# Patient Record
Sex: MALE | URBAN - METROPOLITAN AREA
[De-identification: names, ages, dates, MRNs, and addresses within clinical notes are randomized per-mention and may not be internally consistent; named-entity substitution may affect disease eponyms.]

---

## 2021-09-02 ENCOUNTER — OFFICE VISIT (OUTPATIENT)
Dept: URBAN - METROPOLITAN AREA CLINIC 128 | Facility: CLINIC | Age: 71
End: 2021-09-02
Payer: MEDICARE

## 2021-09-02 ENCOUNTER — WEB ENCOUNTER (OUTPATIENT)
Dept: URBAN - METROPOLITAN AREA CLINIC 128 | Facility: CLINIC | Age: 71
End: 2021-09-02

## 2021-09-02 ENCOUNTER — DASHBOARD ENCOUNTERS (OUTPATIENT)
Age: 71
End: 2021-09-02

## 2021-09-02 DIAGNOSIS — Z79.01 CHRONIC ANTICOAGULATION: ICD-10-CM

## 2021-09-02 DIAGNOSIS — Z12.11 COLON CANCER SCREENING: ICD-10-CM

## 2021-09-02 PROBLEM — 49436004: Status: ACTIVE | Noted: 2021-09-02

## 2021-09-02 PROBLEM — 711150003: Status: ACTIVE | Noted: 2021-09-02

## 2021-09-02 PROBLEM — 428283002 HISTORY OF POLYP OF COLON: Status: ACTIVE | Noted: 2021-09-02

## 2021-09-02 PROCEDURE — 99203 OFFICE O/P NEW LOW 30 MIN: CPT | Performed by: INTERNAL MEDICINE

## 2021-09-02 PROCEDURE — 99243 OFF/OP CNSLTJ NEW/EST LOW 30: CPT | Performed by: INTERNAL MEDICINE

## 2021-09-02 RX ORDER — RIVAROXABAN 20 MG/1
1 TABLET WITH FOOD TABLET, FILM COATED ORAL ONCE A DAY
Status: ACTIVE | COMMUNITY

## 2021-09-02 RX ORDER — SODIUM SULFATE, MAGNESIUM SULFATE, AND POTASSIUM CHLORIDE 17.75; 2.7; 2.25 G/1; G/1; G/1
12 TABLETS TABLET ORAL
Qty: 24 TABLETS | Refills: 0 | OUTPATIENT
Start: 2021-09-02 | End: 2021-09-03

## 2021-09-02 RX ORDER — TADALAFIL 5 MG/1
1 TABLET AS NEEDED TABLET, FILM COATED ORAL ONCE A DAY
Status: ACTIVE | COMMUNITY

## 2021-09-02 RX ORDER — SIMVASTATIN 40 MG/1
1 TABLET IN THE EVENING TABLET, FILM COATED ORAL ONCE A DAY
Status: ACTIVE | COMMUNITY

## 2021-09-02 NOTE — HPI-TODAY'S VISIT:
pt referred by dr. nicole lowery for colon cancer screening.    pt with hx of afib on xarelto also had prostate cancer s/p prostatectomy in 1/2021 hx of renal cell cancer s/p resection last colon in 2016 no prior hx of colon polyps or cancer as far as he remembers.  5 yr f/u given notes normal stools with no hematochezia normal appetite stable weight  no family history of colon cancer or colon polyps  no other complaints

## 2021-12-10 ENCOUNTER — CLAIMS CREATED FROM THE CLAIM WINDOW (OUTPATIENT)
Dept: URBAN - METROPOLITAN AREA CLINIC 4 | Facility: CLINIC | Age: 71
End: 2021-12-10
Payer: MEDICARE

## 2021-12-10 ENCOUNTER — OFFICE VISIT (OUTPATIENT)
Dept: URBAN - METROPOLITAN AREA SURGERY CENTER 31 | Facility: SURGERY CENTER | Age: 71
End: 2021-12-10
Payer: MEDICARE

## 2021-12-10 DIAGNOSIS — D12.5 BENIGN NEOPLASM OF SIGMOID COLON: ICD-10-CM

## 2021-12-10 DIAGNOSIS — D12.4 BENIGN NEOPLASM OF DESCENDING COLON: ICD-10-CM

## 2021-12-10 DIAGNOSIS — D12.4 ADENOMA OF DESCENDING COLON: ICD-10-CM

## 2021-12-10 DIAGNOSIS — D12.5 ADENOMA OF SIGMOID COLON: ICD-10-CM

## 2021-12-10 PROCEDURE — G8907 PT DOC NO EVENTS ON DISCHARG: HCPCS | Performed by: INTERNAL MEDICINE

## 2021-12-10 PROCEDURE — 45385 COLONOSCOPY W/LESION REMOVAL: CPT | Performed by: INTERNAL MEDICINE

## 2021-12-10 PROCEDURE — 88305 TISSUE EXAM BY PATHOLOGIST: CPT | Performed by: PATHOLOGY
